# Patient Record
(demographics unavailable — no encounter records)

---

## 2025-03-19 NOTE — ASSESSMENT
[FreeTextEntry1] : Mr. SHERRI SCHNEIDER, 90 year old male, never a smoker, w/ hx of CLL, BPH, HLD, CAD (s/p PCI). He recently presented with cough, CXR revealed right paratracheal mass, concerning for malignancy. Referred by Dr. Elba Lee (heme/onc).   He presents today for CT Sx consultation.  I have reviewed the patient's medical records and diagnostic images at time of this office consultation and have made the following recommendation: 1.   Recommendations reviewed with patient during this office visit, and all questions answered; Patient instructed on the importance of follow up and verbalizes understanding.

## 2025-03-19 NOTE — HISTORY OF PRESENT ILLNESS
[FreeTextEntry1] : Mr. SHERRI SCHNEIDER, 90 year old male, never a smoker, w/ hx of CLL, BPH, HLD, CAD (s/p PCI). He recently presented with cough, CXR revealed right paratracheal mass, concerning for malignancy. Referred by Dr. Elba Lee (heme/onc).   PET/CT on 4/22/21 ([facility]): - There is a hypermetabolic soft tissue nodule/lymph node in the anterior mediastinum, which measures 2 x 1.8 cm, image 107. The standard uptake values of this activity range from 3.7-4.7. - Otherwise, no abnormal hypermetabolic activity to suggest malignancy at this time. - Otherwise, no evidence of enlarged lymphadenopathy in the neck, chest, abdomen or pelvis. - Reticular opacities associated with bronchiectasis and volume loss in the medial left upper lobe of the lung. These findings are likely post infectious/inflammatory in etiology. - Small pulmonary nodules in the lung bases, which may be post infectious/inflammatory in etiology. - Cholelithiasis. - Diverticular disease. - Enlarged prostate.  CXR on 2/27/25 ([facility]): - 11 cm right paratracheal mass/density, new since prior study. Recommend intravenous contrast-enhanced CT scan of the chest for further evaluation. - New small right pleural effusion versus pleural thickening.  CT Chest on.... -   PET/CT on... -    He presents today for CT Sx consultation.

## 2025-03-19 NOTE — CONSULT LETTER
[Dear  ___] : Dear  [unfilled], [Consult Letter:] : I had the pleasure of evaluating your patient, [unfilled]. [( Thank you for referring [unfilled] for consultation for _____ )] : Thank you for referring [unfilled] for consultation for [unfilled] [Consult Closing:] : Thank you very much for allowing me to participate in the care of this patient.  If you have any questions, please do not hesitate to contact me. [Sincerely,] : Sincerely, [FreeTextEntry2] : Dr. Elba Lee (heme/onc/ref)  [FreeTextEntry3] : Cheryl Odom MD Attending Surgeon Division of Thoracic Surgery  [employer] [facility] [id number] [place] [address] Tel: [phone] Fax: [phone]